# Patient Record
Sex: FEMALE | Race: WHITE | NOT HISPANIC OR LATINO | Employment: UNEMPLOYED | URBAN - METROPOLITAN AREA
[De-identification: names, ages, dates, MRNs, and addresses within clinical notes are randomized per-mention and may not be internally consistent; named-entity substitution may affect disease eponyms.]

---

## 2024-03-12 ENCOUNTER — OFFICE VISIT (OUTPATIENT)
Age: 59
End: 2024-03-12
Payer: COMMERCIAL

## 2024-03-12 VITALS
BODY MASS INDEX: 28.4 KG/M2 | OXYGEN SATURATION: 98 % | TEMPERATURE: 99.1 F | SYSTOLIC BLOOD PRESSURE: 158 MMHG | WEIGHT: 154.32 LBS | HEART RATE: 80 BPM | RESPIRATION RATE: 17 BRPM | HEIGHT: 62 IN | DIASTOLIC BLOOD PRESSURE: 82 MMHG

## 2024-03-12 DIAGNOSIS — R09.81 SINUS CONGESTION: ICD-10-CM

## 2024-03-12 DIAGNOSIS — H66.92 ACUTE OTITIS MEDIA, LEFT: Primary | ICD-10-CM

## 2024-03-12 PROCEDURE — 99203 OFFICE O/P NEW LOW 30 MIN: CPT | Performed by: PHYSICIAN ASSISTANT

## 2024-03-12 RX ORDER — AZITHROMYCIN 250 MG/1
TABLET, FILM COATED ORAL
Qty: 6 TABLET | Refills: 0 | Status: SHIPPED | OUTPATIENT
Start: 2024-03-12 | End: 2024-03-16

## 2024-03-12 RX ORDER — FLUTICASONE PROPIONATE 50 MCG
2 SPRAY, SUSPENSION (ML) NASAL DAILY
Qty: 16 G | Refills: 0 | Status: SHIPPED | OUTPATIENT
Start: 2024-03-12

## 2024-03-12 RX ORDER — LEVOTHYROXINE SODIUM 112 MCG
TABLET ORAL
COMMUNITY
Start: 2024-02-26

## 2024-03-12 NOTE — PATIENT INSTRUCTIONS
Ear Infection   WHAT YOU NEED TO KNOW:   An ear infection is also called otitis media. Blocked or swollen eustachian tubes can cause an infection. Eustachian tubes connect the middle ear to the back of the nose and throat. They drain fluid from the middle ear. You may have a buildup of fluid in your ear. Germs build up in the fluid and infection develops.       DISCHARGE INSTRUCTIONS:   Return to the emergency department if:   You have clear fluid coming from your ear.    You have a stiff neck, headache, and a fever.    Call your doctor if:   You see blood or pus draining from your ear.    Your ear pain gets worse or does not go away, even after treatment.    The outside of your ear is red or swollen.    You are vomiting or have diarrhea.    You have questions or concerns about your condition or care.    Medicines:  You may  need any of the following:  Acetaminophen  decreases pain and fever. It is available without a doctor's order. Ask how much to take and how often to take it. Follow directions. Read the labels of all other medicines you are using to see if they also contain acetaminophen, or ask your doctor or pharmacist. Acetaminophen can cause liver damage if not taken correctly.    NSAIDs , such as ibuprofen, help decrease swelling, pain, and fever. This medicine is available with or without a doctor's order. NSAIDs can cause stomach bleeding or kidney problems in certain people. If you take blood thinner medicine, always ask your healthcare provider if NSAIDs are safe for you. Always read the medicine label and follow directions.    Ear drops  may contain medicine to decrease pain and inflammation.    Antibiotics  help treat a bacterial infection.    Take your medicine as directed.  Contact your healthcare provider if you think your medicine is not helping or if you have side effects. Tell your provider if you are allergic to any medicine. Keep a list of the medicines, vitamins, and herbs you take. Include  the amounts, and when and why you take them. Bring the list or the pill bottles to follow-up visits. Carry your medicine list with you in case of an emergency.    Self-care:   Apply heat  on your ear for 15 to 20 minutes, 3 to 4 times a day or as directed. You can apply heat with an electric heating pad, hot water bottle, or warm compress. Always put a cloth between your skin and the heat pack to prevent burns. Heat helps decrease pain.    Apply ice  on your ear for 15 to 20 minutes, 3 to 4 times a day for 2 days or as directed. Use an ice pack, or put crushed ice in a plastic bag. Cover it with a towel before you apply it to your ear. Ice decreases swelling and pain.    Prevent an ear infection:   Wash your hands often  to help prevent the spread of germs. Ask everyone in your house to wash their hands with soap and water. Ask them to wash after they use the bathroom or change a diaper. Remind them to wash before they prepare or eat food.         Stay away from people who are ill.  Some germs spread easily and quickly through contact.    Follow up with your doctor as directed:  Write down your questions so you remember to ask them during your visits.  © Copyright [employer] 2023 Information is for End User's use only and may not be sold, redistributed or otherwise used for commercial purposes.  The above information is an  only. It is not intended as medical advice for individual conditions or treatments. Talk to your doctor, nurse or pharmacist before following any medical regimen to see if it is safe and effective for you.      Rhinosinusitis   WHAT YOU NEED TO KNOW:   Rhinosinusitis (RS) is inflammation or infection of your nasal passages and sinuses. RS is most often caused by a virus but may be caused by bacteria. Acute RS lasts up to 12 weeks. Chronic RS lasts more than 12 weeks. Recurrent RS means you have 4 or more infections in 1 year.       DISCHARGE INSTRUCTIONS:   Return to the emergency  department if:   You have trouble breathing, or wheezing that is getting worse.    You have a stiff neck, a fever, or a bad headache.    You cannot open your eye.    Your eyeball bulges out, or you cannot move your eye.    You are more sleepy than usual, or you notice changes in your ability to think, move, or talk.    You have swelling of your forehead or scalp.    Call your doctor if:   You have vision changes, such as double vision.    Your eye and eyelid are red, swollen, and painful.    Your symptoms do not improve after 10 days.    You have nausea and are vomiting.    Your nose is bleeding.    You have questions or concerns about your condition or care.    Medicines:  Your symptoms may go away on their own. Your healthcare provider may recommend watchful waiting for up to 10 days before starting antibiotics. Antibiotics will not help if the infection is caused by a virus. Check with your provider before you take any over-the-counter medicines. You may need any of the following:  Acetaminophen  decreases pain and fever. It is available without a doctor's order. Ask how much to take and how often to take it. Follow directions. Read the labels of all other medicines you are using to see if they also contain acetaminophen, or ask your doctor or pharmacist. Acetaminophen can cause liver damage if not taken correctly.    NSAIDs , such as ibuprofen, help decrease swelling, pain, and fever. This medicine is available with or without a doctor's order. NSAIDs can cause stomach bleeding or kidney problems in certain people. If you take blood thinner medicine, always ask your healthcare provider if NSAIDs are safe for you. Always read the medicine label and follow directions.    Nasal steroid sprays  help decrease inflammation in your nose and sinuses.    Decongestants  help reduce swelling and drain mucus in the nose and sinuses. They may help you breathe easier. Do not use decongestants for more than 3  days.    Antihistamines  help dry mucus in the nose and relieve sneezing.    Antibiotics  help treat or prevent a bacterial infection.    Self-care:   Rinse your sinuses as directed.  Use a sinus rinse device to rinse your nasal passages with a saline (salt water) solution or distilled water. Do not  use tap water. A sinus rinse will help thin the mucus in your nose and rinse away pollen and dirt. It will also help lower swelling so you can breathe normally.    Use a humidifier  to increase air moisture in your home. Moist air may make it easier for you to breathe and help decrease your cough.    Sleep with your head raised.  Place an extra pillow under your head before you go to sleep to help your sinuses drain.    Drink liquids as directed.  Ask your healthcare provider how much liquid to drink each day and which liquids are best for you. Liquids will thin the mucus in your nose and help it drain. Do not have drinks that contain alcohol or caffeine.    Do not smoke, and avoid secondhand smoke.  Nicotine and other chemicals in cigarettes and cigars can make your symptoms worse. Ask your healthcare provider for information if you currently smoke and need help to quit. E-cigarettes or smokeless tobacco still contain nicotine. Talk to your healthcare provider before you use these products.    Prevent the spread of germs:   Wash your hands often with soap and water.  Wash your hands after you use the bathroom, change a child's diaper, or sneeze. Wash your hands before you prepare or eat food.         Stay away from people who are sick.  Some germs spread easily and quickly through contact.    Follow up with your doctor as directed:  You may be referred to an ear, nose, and throat specialist. Write down your questions so you remember to ask them during your visits.  © Copyright [employer] 2023 Information is for End User's use only and may not be sold, redistributed or otherwise used for commercial purposes.  The above  information is an  only. It is not intended as medical advice for individual conditions or treatments. Talk to your doctor, nurse or pharmacist before following any medical regimen to see if it is safe and effective for you.

## 2024-03-12 NOTE — PROGRESS NOTES
"  [facility]        NAME: Hailey Kauffman is a 58 y.o. female  : 1965    MRN: [id number]  DATE: 2024  TIME: 8:56 AM    Assessment and Plan   Acute otitis media, left [H66.92]  1. Acute otitis media, left  azithromycin (ZITHROMAX) 250 mg tablet      2. Sinus congestion  fluticasone (FLONASE) 50 mcg/act nasal spray            Patient Instructions     Patient has acute otitis media of the left ear which I will treat with a Z-Nghia and also prescribed her Flonase for sinus congestion.  Discussed OTC cold meds, pain control, close observation.  Follow up with PCP in 3-5 days.  Proceed to  ER if symptoms worsen.    If tests have been performed at [facility], our office will contact you with results if changes need to be made to the care plan discussed with you at the visit.  You can review your full results on [facility].    Chief Complaint     Chief Complaint   Patient presents with    Cold Like Symptoms     Sinus pressure, facial pressure, \"felt like I was getting a cold\" x 2 weeks ago. Starting last night - throat irritation, facial pressure and congestion, stuffy, L ear pain. Denies fevers. OTC - vitamins         History of Present Illness       Patient presents with recent onset of sinus pain and pressure, congestion, left ear pain and pressure, PND.  This was preceded 2 weeks ago with cold symptoms which have since resolved.  Denies fever, chills, recent COVID exposure.  Has been taking over-the-counter vitamin supplements.        Review of Systems   Review of Systems   Constitutional: Negative.    HENT:  Positive for congestion, ear pain (left), postnasal drip, sinus pressure and sinus pain.    Respiratory: Negative.     Cardiovascular: Negative.    Gastrointestinal: Negative.    Genitourinary: Negative.          Current Medications       Current Outpatient Medications:     azithromycin (ZITHROMAX) 250 mg tablet, Take 2 tablets today then 1 tablet daily x 4 days, Disp: 6 tablet, Rfl: " "0    fluticasone (FLONASE) 50 mcg/act nasal spray, 2 sprays into each nostril daily, Disp: 16 g, Rfl: 0    Golimumab (SIMPONI ARIA IV), Inject into a catheter in a vein once, Disp: , Rfl:     Synthroid 112 MCG tablet, TAKE 1 TABLET (112 MCG TOTAL) BY MOUTH 30 MINUTES BEFORE BREAKFAST., Disp: , Rfl:     Current Allergies     Allergies as of 03/12/2024 - Reviewed 03/12/2024   Allergen Reaction Noted    Penicillins Rash 03/12/2024            The following portions of the patient's history were reviewed and updated as appropriate: allergies, current medications, past family history, past medical history, past social history, past surgical history and problem list.     Past Medical History:   Diagnosis Date    Arthritis     Thyroid disease        Past Surgical History:   Procedure Laterality Date    HAND SURGERY Bilateral     HYSTERECTOMY      KNEE SURGERY Left     TONSILLECTOMY         Family History   Problem Relation Age of Onset    Dementia Mother          Medications have been verified.        Objective   /82 (BP Location: Right arm, Patient Position: Sitting, Cuff Size: Standard)   Pulse 80   Temp 99.1 °F (37.3 °C) (Tympanic)   Resp 17   Ht 5' 1.5\" (1.562 m)   Wt 70 kg (154 lb 5.2 oz)   SpO2 98%   BMI 28.69 kg/m²   No LMP recorded. Patient has had a hysterectomy.       Physical Exam     Physical Exam  Vitals reviewed.   Constitutional:       General: She is not in acute distress.     Appearance: She is well-developed.   HENT:      Right Ear: Tympanic membrane, ear canal and external ear normal.      Left Ear: Ear canal and external ear normal.      Ears:      Comments: Left TM bulging and injected with air-fluid level but intact.     Nose: Mucosal edema (Bilateral boggy turbinates) and congestion present.      Mouth/Throat:      Mouth: Mucous membranes are moist.      Pharynx: Posterior oropharyngeal erythema (PND) present. No oropharyngeal exudate.      Tonsils: No tonsillar exudate.   Cardiovascular: "      Rate and Rhythm: Normal rate and regular rhythm.      Pulses: Normal pulses.      Heart sounds: Normal heart sounds. No murmur heard.  Pulmonary:      Effort: Pulmonary effort is normal. No respiratory distress.      Breath sounds: Normal breath sounds.   Musculoskeletal:      Cervical back: Neck supple.   Lymphadenopathy:      Cervical: No cervical adenopathy.   Neurological:      Mental Status: She is alert and oriented to person, place, and time.